# Patient Record
Sex: FEMALE | Race: WHITE | NOT HISPANIC OR LATINO
[De-identification: names, ages, dates, MRNs, and addresses within clinical notes are randomized per-mention and may not be internally consistent; named-entity substitution may affect disease eponyms.]

---

## 2022-11-10 ENCOUNTER — NON-APPOINTMENT (OUTPATIENT)
Age: 47
End: 2022-11-10

## 2022-11-10 PROBLEM — Z00.00 ENCOUNTER FOR PREVENTIVE HEALTH EXAMINATION: Status: ACTIVE | Noted: 2022-11-10

## 2022-11-11 ENCOUNTER — APPOINTMENT (OUTPATIENT)
Dept: COLORECTAL SURGERY | Facility: CLINIC | Age: 47
End: 2022-11-11

## 2022-11-11 ENCOUNTER — NON-APPOINTMENT (OUTPATIENT)
Age: 47
End: 2022-11-11

## 2022-11-11 VITALS
DIASTOLIC BLOOD PRESSURE: 64 MMHG | HEART RATE: 72 BPM | BODY MASS INDEX: 25.44 KG/M2 | TEMPERATURE: 97.3 F | HEIGHT: 64 IN | WEIGHT: 149 LBS | SYSTOLIC BLOOD PRESSURE: 100 MMHG

## 2022-11-11 DIAGNOSIS — C20 MALIGNANT NEOPLASM OF RECTUM: ICD-10-CM

## 2022-11-11 PROCEDURE — 99202 OFFICE O/P NEW SF 15 MIN: CPT | Mod: 25

## 2022-11-11 PROCEDURE — 99072 ADDL SUPL MATRL&STAF TM PHE: CPT

## 2022-11-11 PROCEDURE — 45330 DIAGNOSTIC SIGMOIDOSCOPY: CPT

## 2022-11-11 NOTE — PHYSICAL EXAM
[Abdomen Masses] : No abdominal masses [Abdomen Tenderness] : ~T No ~M abdominal tenderness [No HSM] : no hepatosplenomegaly [Tight] : was tight [FreeTextEntry1] : The risks , benefits and alternatives of the procedure were reviewed with the patient. The patient consents to the planned procedure.\par \par The flexible sigmoidoscope was passed through the anus into the rectum. The scope was passed to   30  cm from the anal verge.\par \par The findings revealed:\par Area of 6 x 5 cm in the distal rectum extending from the dentate line to the first bowel consistent with regression of tumor/treatment effect.  Friable.  Easy contact bleeding.

## 2022-11-11 NOTE — ASSESSMENT
[FreeTextEntry1] : I reviewed with the patient and her  that the findings on examination are consistent with a regression of her primary tumor in the rectum/excellent treatment effect.  I have discussed with them that efforts to improve her bowel habits including stool softeners and daily laxatives may be helpful in avoiding direct contact irritation.  As regards to potential vaginal bleeding I have suggested she seek her gynecologist opinion regarding this new onset.\par \par I had a discussion with her primary medical oncologist regarding these findings.  She will return to medical oncology for surveillance labs and follow-up.\par \par Recommend interval imaging to assess treatment effect of the liver and lung.\par \par All questions answered.

## 2022-11-11 NOTE — HISTORY OF PRESENT ILLNESS
[FreeTextEntry1] : 46 y/o F presents for sigmoidoscopy evaluation, referred by [facility]\par PSH  \par \par h/o rectal bleeding for a few months and smaller BMs\par \par Initial colonoscopy for rectal bleeding performed by Dr. Oren Bernheim at [facility] (8/10/22)\par A polypoid and ulcerated, non obstructing large found found in the rectum. Partially circumferential (2/3 of lumen). No bleeding present. Biopsied\par \par \par Pathology (8/10/22):\par Rectal invasive adenocarcinoma\par Well differentiated \par Tumor budding score: low. Negative for lymphovascular invasion. \par MSI-Stable \par \par CT Chest performed (22)\par Impression: \par Innumerable bilateral parenchymal, subpleural and pleural based nodules. Several of which are cavitary. \par The largest of these nodules measure up to 6 mm. \par No significant adenopathy. \par \par CT A/P (22):\par Impression: \par Enhancing polypoid lesions identified within the rectosigmoid region, extending from the anorectal junction to the rectosigmoid region. SErosal surface is intact with mass effect though appreciated. Mesorectal fascia intact. Greater than four perirectal lymph nodes are noted. Without pelvic adenopathy otherwise appreciated.  \par \par MRI abd/pelvis 22:\par Multiple hepatic lesions are noted, hypoechoic lesion identified within the right hepatic lobe measuring 0.7 cm, hypoechoic lesion noted within the right hepatic lobe measuring 9 mm. 8 mm anterior right subcapsular lesion. Inferior aspect of R hepatic lobe measuring 1.4 x 1 cm, anterior left hepatic lob measuring 1x0.6 cm, interior right hepatic lesions noted on post contrast image measuring 8 mm, 1.1x 0.8 cm w/ additional smaller right hepatic lesions noted. Findings highly suggestive of underlying metastatic disease\par Impression:\par Primary lesion identified within the rectosigmoid region, with associated perirectal nodularity as described above.Hepatic metastases. No significant adenopathy\par \par PET-CT performed (22)\par Hypermetabolic focus in the lower rectal region with SUV max of 13.6. \par Suspicious for malignancy\par There are 2 hypermetabolic hepatic lesions in segment 2 and segment 6 which probably represent hepatic metastases. These would better be evaluated with IV contrast MR imaging. \par There are multiple small pulmonary lesions (3-5 mm), the majority of which are cavitary, which are suspicious for subtle pulmonary metastases, possibly treated metastases, inflammatory nodules are felt less likely. There is no corresponding abnormal hyperactivity.  \par \par Under care of [facility] Dr. Suleiman Montelongo, started chemotherapy approx 2022 which she receives in [facility] every 2 weeks.\par \par Pt reports rectal bleeding and rectal pain had resolved since starting treatment, however returned since the last treatment (2 weeks ago) and may have occurred when she urinated. Admits to having harder stools one week ago. She used to take Constipation Care, but stopped during treatment since symptoms had resolved\par Denies fever, body aches or chills\par \par BH: daily, passing smaller hard balls of stools and c/o incomplete emptying\par Eating fruits, vegetables. Admits to limited water intake due to nausea\par Trying to avoid sugar, gluten and dairy\par Takes Tylenol or Advil as needed for pain w/ minimal improvement

## 2022-12-02 ENCOUNTER — APPOINTMENT (OUTPATIENT)
Dept: COLORECTAL SURGERY | Facility: CLINIC | Age: 47
End: 2022-12-02

## 2022-12-02 ENCOUNTER — NON-APPOINTMENT (OUTPATIENT)
Age: 47
End: 2022-12-02

## 2022-12-02 NOTE — HISTORY OF PRESENT ILLNESS
[FreeTextEntry1] : 48 y/o F presents for follow up evaluation of rectal ca \par PSH  \par \par H/o rectal bleeding, had initial colonoscopy for rectal bleeding performed by Dr. Oren Bernheim at [facility] (8/10/22). A polypoid and ulcerated, non obstructing large mass found in the rectum. partially circumferential (2/3 of lumen) No bleeding present. Biopsied. \par \par Pathology 22, rectal invasive adenocarcinoma. Well differentiated. Tumor budding score low: Negative for lymphovascular invasion. MSI-stable \par \par \par \par CT Chest performed (22)\par Impression: \par Innumerable bilateral parenchymal, subpleural and pleural based nodules. Several of which are cavitary. \par The largest of these nodules measure up to 6 mm. \par No significant adenopathy. \par \par CT A/P (22):\par Impression: \par Enhancing polypoid lesions identified within the rectosigmoid region, extending from the anorectal junction to the rectosigmoid region. SErosal surface is intact with mass effect though appreciated. Mesorectal fascia intact. Greater than four perirectal lymph nodes are noted. Without pelvic adenopathy otherwise appreciated. \par \par MRI abd/pelvis 22:\par Multiple hepatic lesions are noted, hypoechoic lesion identified within the right hepatic lobe measuring 0.7 cm, hypoechoic lesion noted within the right hepatic lobe measuring 9 mm. 8 mm anterior right subcapsular lesion. Inferior aspect of R hepatic lobe measuring 1.4 x 1 cm, anterior left hepatic lob measuring 1x0.6 cm, interior right hepatic lesions noted on post contrast image measuring 8 mm, 1.1x 0.8 cm w/ additional smaller right hepatic lesions noted. Findings highly suggestive of underlying metastatic disease\par Impression:\par Primary lesion identified within the rectosigmoid region, with associated perirectal nodularity as described above.Hepatic metastases. No significant adenopathy\par \par PET-CT performed (22)\par Hypermetabolic focus in the lower rectal region with SUV max of 13.6. \par Suspicious for malignancy\par There are 2 hypermetabolic hepatic lesions in segment 2 and segment 6 which probably represent hepatic metastases. These would better be evaluated with IV contrast MR imaging. \par There are multiple small pulmonary lesions (3-5 mm), the majority of which are cavitary, which are suspicious for subtle pulmonary metastases, possibly treated metastases, inflammatory nodules are felt less likely. There is no corresponding abnormal hyperactivity. \par \par Patient started chemotherapy ~ 22 with Med On[facility]: Dr. Suleiman Montelongo at [facility] every 2 weeks. \par \par Most recently seen in follow up evaluation 22, flexible sigmoidoscopy performed, scope passed up to 30 cm from AV. area of 6 x 5 cm of distal rectum extending from the dentate line to the first bowel consistent with regression of tumor / treatment effect. Friably easy contact bleeding. \par \par Advised pt return to [facility] for surveillance labs and f/u.

## 2022-12-14 ENCOUNTER — NON-APPOINTMENT (OUTPATIENT)
Age: 47
End: 2022-12-14